# Patient Record
Sex: FEMALE | Race: WHITE | URBAN - NONMETROPOLITAN AREA
[De-identification: names, ages, dates, MRNs, and addresses within clinical notes are randomized per-mention and may not be internally consistent; named-entity substitution may affect disease eponyms.]

---

## 2017-02-27 ENCOUNTER — FOLLOW UP ESTABLISHED (OUTPATIENT)
Dept: URBAN - NONMETROPOLITAN AREA CLINIC 6 | Facility: CLINIC | Age: 52
End: 2017-02-27

## 2017-02-27 DIAGNOSIS — H52.13 MYOPIA, BILATERAL: Primary | ICD-10-CM

## 2017-02-27 PROCEDURE — S0621 ROUTINE OPHTHALMOLOGICAL EXA: HCPCS | Performed by: OPTOMETRIST

## 2017-02-27 ASSESSMENT — VISUAL ACUITY
OD: 20/20
OS: 20/20

## 2017-02-27 ASSESSMENT — INTRAOCULAR PRESSURE
OS: 14
OD: 14

## 2019-06-17 ENCOUNTER — FOLLOW UP ESTABLISHED (OUTPATIENT)
Dept: URBAN - NONMETROPOLITAN AREA CLINIC 6 | Facility: CLINIC | Age: 54
End: 2019-06-17

## 2019-06-17 PROCEDURE — S0621 ROUTINE OPHTHALMOLOGICAL EXA: HCPCS | Performed by: OPTOMETRIST

## 2019-06-17 ASSESSMENT — VISUAL ACUITY
OS: 20/20
OD: 20/20

## 2019-06-17 ASSESSMENT — INTRAOCULAR PRESSURE
OS: 18
OD: 18

## 2019-07-12 ENCOUNTER — FOLLOW UP ESTABLISHED (OUTPATIENT)
Dept: URBAN - NONMETROPOLITAN AREA CLINIC 6 | Facility: CLINIC | Age: 54
End: 2019-07-12

## 2019-07-12 PROCEDURE — 92310 CONTACT LENS FITTING OU: CPT | Performed by: OPTOMETRIST

## 2021-11-01 ENCOUNTER — OFFICE VISIT (OUTPATIENT)
Dept: URBAN - NONMETROPOLITAN AREA CLINIC 6 | Facility: CLINIC | Age: 56
End: 2021-11-01

## 2021-11-01 DIAGNOSIS — H52.4 PRESBYOPIA: ICD-10-CM

## 2021-11-01 PROCEDURE — S0621 ROUTINE OPHTHALMOLOGICAL EXA: HCPCS | Performed by: OPTOMETRIST

## 2021-11-01 ASSESSMENT — VISUAL ACUITY
OD: 20/20
OS: 20/20

## 2023-02-28 ENCOUNTER — OFFICE VISIT (OUTPATIENT)
Dept: URBAN - NONMETROPOLITAN AREA CLINIC 6 | Facility: CLINIC | Age: 58
End: 2023-02-28

## 2023-02-28 DIAGNOSIS — H25.13 AGE-RELATED NUCLEAR CATARACT, BILATERAL: ICD-10-CM

## 2023-02-28 DIAGNOSIS — H52.4 PRESBYOPIA: ICD-10-CM

## 2023-02-28 DIAGNOSIS — H52.13 MYOPIA, BILATERAL: Primary | ICD-10-CM

## 2023-02-28 PROCEDURE — S0621 ROUTINE OPHTHALMOLOGICAL EXA: HCPCS | Performed by: OPTOMETRIST

## 2023-02-28 PROCEDURE — 92310 CONTACT LENS FITTING OU: CPT | Performed by: OPTOMETRIST

## 2023-02-28 ASSESSMENT — INTRAOCULAR PRESSURE
OD: 18
OS: 18

## 2023-02-28 ASSESSMENT — VISUAL ACUITY
OD: 20/20
OS: 20/20

## 2023-02-28 NOTE — IMPRESSION/PLAN
Impression: Presbyopia: H52.4. Plan: Discussed findings with patient. Patient okay with just using otc readers over CL as needed.